# Patient Record
Sex: FEMALE | Race: AMERICAN INDIAN OR ALASKA NATIVE
[De-identification: names, ages, dates, MRNs, and addresses within clinical notes are randomized per-mention and may not be internally consistent; named-entity substitution may affect disease eponyms.]

---

## 2020-10-11 ENCOUNTER — HOSPITAL ENCOUNTER (EMERGENCY)
Dept: HOSPITAL 5 - ED | Age: 30
Discharge: HOME | End: 2020-10-11
Payer: MEDICAID

## 2020-10-11 VITALS — SYSTOLIC BLOOD PRESSURE: 177 MMHG | DIASTOLIC BLOOD PRESSURE: 96 MMHG

## 2020-10-11 DIAGNOSIS — F17.200: ICD-10-CM

## 2020-10-11 DIAGNOSIS — Z79.899: ICD-10-CM

## 2020-10-11 DIAGNOSIS — I10: ICD-10-CM

## 2020-10-11 DIAGNOSIS — Z98.51: ICD-10-CM

## 2020-10-11 DIAGNOSIS — F43.21: Primary | ICD-10-CM

## 2020-10-11 LAB
BACTERIA #/AREA URNS HPF: (no result) /HPF
BASOPHILS # (AUTO): 0.1 K/MM3 (ref 0–0.1)
BASOPHILS NFR BLD AUTO: 0.7 % (ref 0–1.8)
BILIRUB UR QL STRIP: (no result)
BLOOD UR QL VISUAL: (no result)
BUN SERPL-MCNC: 12 MG/DL (ref 7–17)
BUN/CREAT SERPL: 15 %
CALCIUM SERPL-MCNC: 9.3 MG/DL (ref 8.4–10.2)
EOSINOPHIL # BLD AUTO: 0 K/MM3 (ref 0–0.4)
EOSINOPHIL NFR BLD AUTO: 0.1 % (ref 0–4.3)
HCT VFR BLD CALC: 43.2 % (ref 30.3–42.9)
HEMOLYSIS INDEX: 23
HGB BLD-MCNC: 14.4 GM/DL (ref 10.1–14.3)
LYMPHOCYTES # BLD AUTO: 2.2 K/MM3 (ref 1.2–5.4)
LYMPHOCYTES NFR BLD AUTO: 13.8 % (ref 13.4–35)
MCHC RBC AUTO-ENTMCNC: 33 % (ref 30–34)
MCV RBC AUTO: 84 FL (ref 79–97)
MONOCYTES # (AUTO): 0.9 K/MM3 (ref 0–0.8)
MONOCYTES % (AUTO): 5.6 % (ref 0–7.3)
MUCOUS THREADS #/AREA URNS HPF: (no result) /HPF
PH UR STRIP: 6 [PH] (ref 5–7)
PLATELET # BLD: 282 K/MM3 (ref 140–440)
PROT UR STRIP-MCNC: (no result) MG/DL
RBC # BLD AUTO: 5.15 M/MM3 (ref 3.65–5.03)
RBC #/AREA URNS HPF: 5 /HPF (ref 0–6)
UROBILINOGEN UR-MCNC: < 2 MG/DL (ref ?–2)
WBC #/AREA URNS HPF: 7 /HPF (ref 0–6)

## 2020-10-11 PROCEDURE — 80320 DRUG SCREEN QUANTALCOHOLS: CPT

## 2020-10-11 PROCEDURE — G0480 DRUG TEST DEF 1-7 CLASSES: HCPCS

## 2020-10-11 PROCEDURE — 85025 COMPLETE CBC W/AUTO DIFF WBC: CPT

## 2020-10-11 PROCEDURE — 81001 URINALYSIS AUTO W/SCOPE: CPT

## 2020-10-11 PROCEDURE — 36415 COLL VENOUS BLD VENIPUNCTURE: CPT

## 2020-10-11 PROCEDURE — 80307 DRUG TEST PRSMV CHEM ANLYZR: CPT

## 2020-10-11 PROCEDURE — 81025 URINE PREGNANCY TEST: CPT

## 2020-10-11 PROCEDURE — 80048 BASIC METABOLIC PNL TOTAL CA: CPT

## 2020-10-11 NOTE — EMERGENCY DEPARTMENT REPORT
HPI





- General


Chief Complaint: Psych


Time Seen by Provider: 10/11/20 12:21





- HPI


HPI: 





Room 19








Patient is a 29-year-old female present with a chief complaint of sadness.  

Patient states she has felt intermittently sad for the past 8 years.  Patient 

states her grandmother  recently () and this brought on her current

sadness.  Patient denies suicidal or homicidal ideation.  Patient denies visual 

or auditory hallucinations.  Patient states she has not been sleeping since 

.  Patient states her mother made her come to the hospital for 

evaluation.  Patient states she was on blood pressure medication but stopped 

taking it secondary to side effects 2 years ago





ED Past Medical Hx





- Past Medical History


Previous Medical History?: Yes


Hx Hypertension: Yes





- Surgical History


Past Surgical History?: Yes


Additional Surgical History: tubal ligation





- Family History


Family history: no significant





- Social History


Smoking Status: Current Every Day Smoker (1 pack/day)


Substance Use Type: None (Denies illicit drug use), Alcohol (Occasional)





- Medications


Home Medications: 


                                Home Medications











 Medication  Instructions  Recorded  Confirmed  Last Taken  Type


 


Cyclobenzaprine [Flexeril] 10 mg PO TID PRN #15 tablet 16  Unknown Rx


 


traMADoL [Ultram] 50 mg PO Q6HR PRN #20 tablet 16  Unknown Rx


 


amLODIPine 5 mg PO DAILY #90 tab 10/11/20  Unknown Rx














ED Review of Systems


ROS: 


Stated complaint: MENTAL BREAKDOWN


Other details as noted in HPI





Constitutional: no symptoms reported


Respiratory: no symptoms reported


Endocrine: no symptoms reported


Psychiatric: denies: auditory hallucinations, visual hallucinations, homicidal 

thoughts, suicidal thoughts





Physical Exam





- Physical Exam


Vital Signs: 


                                   Vital Signs











  10/11/20





  12:19


 


Temperature 99.2 F


 


Pulse Rate 107 H


 


Respiratory 18





Rate 


 


Blood Pressure 232/113


 


O2 Sat by Pulse 100





Oximetry 











Physical Exam: 





GENERAL: The patient is well-developed well-nourished female sitting on 

stretcher appearing tearful. []


HEENT: Normocephalic.  Atraumatic.  Extraocular motions are intact.  Patient has

 moist mucous membranes.


NECK: Supple.  Trachea midline


CHEST/LUNGS: Clear to auscultation.  There is no respiratory distress noted.


HEART/CARDIOVASCULAR: Regular.  There is no tachycardia.  There is no gallop rub

 or murmur.


ABDOMEN: Abdomen is soft, nontender.  Patient has normal bowel sounds.  There is

 no abdominal distention.


SKIN: There is no rash.  There is no edema.  There is no diaphoresis.


NEURO: The patient is awake, alert, and oriented.  The patient is cooperative.  

The patient has no focal neurologic deficits.  The patient has normal speech


MUSCULOSKELETAL:  There is no evidence of acute injury.





ED Course


                                   Vital Signs











  10/11/20





  12:19


 


Temperature 99.2 F


 


Pulse Rate 107 H


 


Respiratory 18





Rate 


 


Blood Pressure 232/113


 


O2 Sat by Pulse 100





Oximetry 














ED Medical Decision Making





- Lab Data


Result diagrams: 


                                 10/11/20 12:38





                                 10/11/20 12:38





                                Laboratory Tests











  10/11/20 10/11/20 10/11/20





  12:38 12:38 12:38


 


WBC  16.0 H  


 


RBC  5.15 H  


 


Hgb  14.4 H  


 


Hct  43.2 H  


 


MCV  84  


 


MCH  28  


 


MCHC  33  


 


RDW  14.5  


 


Plt Count  282  


 


Lymph % (Auto)  13.8  


 


Mono % (Auto)  5.6  


 


Eos % (Auto)  0.1  


 


Baso % (Auto)  0.7  


 


Lymph # (Auto)  2.2  


 


Mono # (Auto)  0.9 H  


 


Eos # (Auto)  0.0  


 


Baso # (Auto)  0.1  


 


Seg Neutrophils %  79.8 H  


 


Seg Neutrophils #  12.8 H  


 


Sodium   137 


 


Potassium   3.8 


 


Chloride   100.7 


 


Carbon Dioxide   26 


 


Anion Gap   14 


 


BUN   12 


 


Creatinine   0.8 


 


Estimated GFR   > 60 


 


BUN/Creatinine Ratio   15 


 


Glucose   100 


 


Calcium   9.3 


 


Urine Color   


 


Urine Turbidity   


 


Urine pH   


 


Ur Specific Gravity   


 


Urine Protein   


 


Urine Glucose (UA)   


 


Urine Ketones   


 


Urine Blood   


 


Urine Nitrite   


 


Urine Bilirubin   


 


Urine Urobilinogen   


 


Ur Leukocyte Esterase   


 


Urine WBC (Auto)   


 


Urine RBC (Auto)   


 


U Epithel Cells (Auto)   


 


Urine Bacteria (Auto)   


 


Urine Mucus   


 


Urine HCG, Qual   


 


Salicylates    < 0.3 L


 


Urine Opiates Screen   


 


Urine Methadone Screen   


 


Acetaminophen   


 


Ur Barbiturates Screen   


 


Ur Phencyclidine Scrn   


 


Ur Amphetamines Screen   


 


U Benzodiazepines Scrn   


 


Urine Cocaine Screen   


 


U Marijuana (THC) Screen   


 


Drugs of Abuse Note   


 


Plasma/Serum Alcohol   














  10/11/20 10/11/20 10/11/20





  12:38 12:38 Unknown


 


WBC   


 


RBC   


 


Hgb   


 


Hct   


 


MCV   


 


MCH   


 


MCHC   


 


RDW   


 


Plt Count   


 


Lymph % (Auto)   


 


Mono % (Auto)   


 


Eos % (Auto)   


 


Baso % (Auto)   


 


Lymph # (Auto)   


 


Mono # (Auto)   


 


Eos # (Auto)   


 


Baso # (Auto)   


 


Seg Neutrophils %   


 


Seg Neutrophils #   


 


Sodium   


 


Potassium   


 


Chloride   


 


Carbon Dioxide   


 


Anion Gap   


 


BUN   


 


Creatinine   


 


Estimated GFR   


 


BUN/Creatinine Ratio   


 


Glucose   


 


Calcium   


 


Urine Color    Yellow


 


Urine Turbidity    Cloudy


 


Urine pH    6.0


 


Ur Specific Gravity    1.021


 


Urine Protein    <15 mg/dl


 


Urine Glucose (UA)    Neg


 


Urine Ketones    Tr


 


Urine Blood    Neg


 


Urine Nitrite    Neg


 


Urine Bilirubin    Neg


 


Urine Urobilinogen    < 2.0


 


Ur Leukocyte Esterase    Lg


 


Urine WBC (Auto)    7.0 H


 


Urine RBC (Auto)    5.0


 


U Epithel Cells (Auto)    28.0 H


 


Urine Bacteria (Auto)    1+


 


Urine Mucus    1+


 


Urine HCG, Qual    Negative


 


Salicylates   


 


Urine Opiates Screen   


 


Urine Methadone Screen   


 


Acetaminophen  5.0 L  


 


Ur Barbiturates Screen   


 


Ur Phencyclidine Scrn   


 


Ur Amphetamines Screen   


 


U Benzodiazepines Scrn   


 


Urine Cocaine Screen   


 


U Marijuana (THC) Screen   


 


Drugs of Abuse Note   


 


Plasma/Serum Alcohol   < 0.01 














  10/11/20





  Unknown


 


WBC 


 


RBC 


 


Hgb 


 


Hct 


 


MCV 


 


MCH 


 


MCHC 


 


RDW 


 


Plt Count 


 


Lymph % (Auto) 


 


Mono % (Auto) 


 


Eos % (Auto) 


 


Baso % (Auto) 


 


Lymph # (Auto) 


 


Mono # (Auto) 


 


Eos # (Auto) 


 


Baso # (Auto) 


 


Seg Neutrophils % 


 


Seg Neutrophils # 


 


Sodium 


 


Potassium 


 


Chloride 


 


Carbon Dioxide 


 


Anion Gap 


 


BUN 


 


Creatinine 


 


Estimated GFR 


 


BUN/Creatinine Ratio 


 


Glucose 


 


Calcium 


 


Urine Color 


 


Urine Turbidity 


 


Urine pH 


 


Ur Specific Gravity 


 


Urine Protein 


 


Urine Glucose (UA) 


 


Urine Ketones 


 


Urine Blood 


 


Urine Nitrite 


 


Urine Bilirubin 


 


Urine Urobilinogen 


 


Ur Leukocyte Esterase 


 


Urine WBC (Auto) 


 


Urine RBC (Auto) 


 


U Epithel Cells (Auto) 


 


Urine Bacteria (Auto) 


 


Urine Mucus 


 


Urine HCG, Qual 


 


Salicylates 


 


Urine Opiates Screen  Negative


 


Urine Methadone Screen  Negative


 


Acetaminophen 


 


Ur Barbiturates Screen  Negative


 


Ur Phencyclidine Scrn  Negative


 


Ur Amphetamines Screen  Negative


 


U Benzodiazepines Scrn  Negative


 


Urine Cocaine Screen  Negative


 


U Marijuana (THC) Screen  Positive


 


Drugs of Abuse Note  Disclamer


 


Plasma/Serum Alcohol 














- Differential Diagnosis


Depression, grieving, adjustment disorder, hypertension


Critical care attestation.: 


If time is entered above; I have spent that time in minutes in the direct care 

of this critically ill patient, excluding procedure time.








ED Disposition


Clinical Impression: 


 Hypertension, Grieving





Disposition: DC-01 TO HOME OR SELFCARE


Is pt being admited?: No


Does the pt Need Aspirin: No


Condition: Stable


Instructions:  Hypertension (ED)


Additional Instructions: 


Return to the emergency department should you develop worsening symptoms, 

inability to tolerate food or liquids, high fever or any other concerns


Prescriptions: 


amLODIPine 5 mg PO DAILY #90 tab


Referrals: 


HOWARD MARIE MD [Staff Physician] - 3-5 Days (Dr. Marie is a primary 

physician.  Please follow-up with him to be established as a patient)


Time of Disposition: 15:57

## 2020-10-11 NOTE — EMERGENCY DEPARTMENT REPORT
Blank Doc





- Documentation


Documentation: 





29-year-old female that presents with depression.  Denies any SI/HI.





This initial assessment/diagnostic orders/clinical plan/treatment(s) is/are 

subject to change based on patient's health status, clinical progression and re-

assessment by fellow clinical providers in the ED.  Further treatment and workup

at subsequent clinical providers discretion.  Patient/guardians urged not to 

elope from the ED as their condition may be serious if not clinically assessed 

and managed.  Initial orders include:


1- Patient sent to MAIN ED for further evaluation and treatment


2- Charge RN was notified to have patient be brought back ASAP.


3- RN was notified to keep patient as close range and observation until room 

available

## 2022-08-26 ENCOUNTER — HOSPITAL ENCOUNTER (EMERGENCY)
Dept: HOSPITAL 5 - ED | Age: 32
LOS: 3 days | Discharge: HOME | End: 2022-08-29
Payer: SELF-PAY

## 2022-08-26 DIAGNOSIS — I10: ICD-10-CM

## 2022-08-26 DIAGNOSIS — F60.0: Primary | ICD-10-CM

## 2022-08-26 DIAGNOSIS — F17.210: ICD-10-CM

## 2022-08-26 DIAGNOSIS — R45.851: ICD-10-CM

## 2022-08-26 DIAGNOSIS — Z72.89: ICD-10-CM

## 2022-08-26 DIAGNOSIS — Z98.51: ICD-10-CM

## 2022-08-26 DIAGNOSIS — Z79.899: ICD-10-CM

## 2022-08-26 DIAGNOSIS — Z20.822: ICD-10-CM

## 2022-08-26 LAB
ALBUMIN SERPL-MCNC: 5.6 G/DL (ref 3.9–5)
ALT SERPL-CCNC: 14 UNITS/L (ref 7–56)
BACTERIA #/AREA URNS HPF: (no result) /HPF
BASOPHILS # (AUTO): 0.1 K/MM3 (ref 0–0.1)
BASOPHILS NFR BLD AUTO: 0.5 % (ref 0–1.8)
BENZODIAZEPINES SCREEN,URINE: (no result)
BUN SERPL-MCNC: 11 MG/DL (ref 7–17)
BUN/CREAT SERPL: 11 %
CALCIUM SERPL-MCNC: 10.3 MG/DL (ref 8.4–10.2)
EOSINOPHIL # BLD AUTO: 0 K/MM3 (ref 0–0.4)
EOSINOPHIL NFR BLD AUTO: 0.1 % (ref 0–4.3)
HCT VFR BLD CALC: 52 % (ref 30.3–42.9)
HEMOLYSIS INDEX: 27
HGB BLD-MCNC: 17 GM/DL (ref 10.1–14.3)
LYMPHOCYTES # BLD AUTO: 2.7 K/MM3 (ref 1.2–5.4)
LYMPHOCYTES NFR BLD AUTO: 15.4 % (ref 13.4–35)
MCHC RBC AUTO-ENTMCNC: 33 % (ref 30–34)
MCV RBC AUTO: 84 FL (ref 79–97)
METHADONE SCREEN,URINE: (no result)
MONOCYTES # (AUTO): 1.3 K/MM3 (ref 0–0.8)
MONOCYTES % (AUTO): 7.4 % (ref 0–7.3)
MUCOUS THREADS #/AREA URNS HPF: (no result) /HPF
OPIATE SCREEN,URINE: (no result)
PLATELET # BLD: 329 K/MM3 (ref 140–440)
RBC # BLD AUTO: 6.19 M/MM3 (ref 3.65–5.03)
RBC #/AREA URNS HPF: 54 /HPF (ref 0–6)
WBC #/AREA URNS HPF: 46 /HPF (ref 0–6)

## 2022-08-26 PROCEDURE — G0480 DRUG TEST DEF 1-7 CLASSES: HCPCS

## 2022-08-26 PROCEDURE — 81001 URINALYSIS AUTO W/SCOPE: CPT

## 2022-08-26 PROCEDURE — 36415 COLL VENOUS BLD VENIPUNCTURE: CPT

## 2022-08-26 PROCEDURE — 80053 COMPREHEN METABOLIC PANEL: CPT

## 2022-08-26 PROCEDURE — U0003 INFECTIOUS AGENT DETECTION BY NUCLEIC ACID (DNA OR RNA); SEVERE ACUTE RESPIRATORY SYNDROME CORONAVIRUS 2 (SARS-COV-2) (CORONAVIRUS DISEASE [COVID-19]), AMPLIFIED PROBE TECHNIQUE, MAKING USE OF HIGH THROUGHPUT TECHNOLOGIES AS DESCRIBED BY CMS-2020-01-R: HCPCS

## 2022-08-26 PROCEDURE — 84702 CHORIONIC GONADOTROPIN TEST: CPT

## 2022-08-26 PROCEDURE — 80320 DRUG SCREEN QUANTALCOHOLS: CPT

## 2022-08-26 PROCEDURE — 85025 COMPLETE CBC W/AUTO DIFF WBC: CPT

## 2022-08-26 PROCEDURE — 80307 DRUG TEST PRSMV CHEM ANLYZR: CPT

## 2022-08-26 PROCEDURE — 87086 URINE CULTURE/COLONY COUNT: CPT

## 2022-08-26 PROCEDURE — 99284 EMERGENCY DEPT VISIT MOD MDM: CPT

## 2022-08-26 NOTE — EMERGENCY DEPARTMENT REPORT
ED General Adult HPI





- General


Chief complaint: Psych


Stated complaint: MENTAL BREAKDOWN


PUI?: No


Time Seen by Provider: 08/26/22 09:38


Source: patient


Mode of arrival: Ambulatory


Limitations: No Limitations





- History of Present Illness


Initial comments: 


This is a 36-year-old female with a past medical history of paranoia, depression

who presents with her mother today complaining of having a mental breakdown.  

Patient's mother states she has been very paranoid stating that the police is 

out to get her and someone is out to get her.  Mom states she is a scattered 

thought and she is speaking in a scattered manner.  Patient denies any suicidal 

ideation today but states she had thoughts of hurting herself in the past with 

no plan of action.  Patient states that she just needs a medication but cannot 

recall what medication she is taking.  Patient states that she was taking 

metronidazole for depression.  It is unclear if patient knows of medication she 

is taking.  Per medical history last time patient was here was about 2 years ago

with the same complaints of having a mental breakdown.





Severity scale (0 -10): 0





- Related Data


                                  Previous Rx's











 Medication  Instructions  Recorded  Last Taken  Type


 


Cyclobenzaprine [Flexeril] 10 mg PO TID PRN #15 tablet 04/04/16 Unknown Rx


 


traMADoL [Ultram] 50 mg PO Q6HR PRN #20 tablet 04/04/16 Unknown Rx


 


amLODIPine 5 mg PO DAILY #90 tab 10/11/20 Unknown Rx











                                    Allergies











Allergy/AdvReac Type Severity Reaction Status Date / Time


 


No Known Allergies Allergy   Verified 08/26/22 08:31














ED Review of Systems


ROS: 


Stated complaint: MENTAL BREAKDOWN


Other details as noted in HPI





Comment: All other systems reviewed and negative





ED Past Medical Hx





- Past Medical History


Hx Hypertension: Yes





- Surgical History


Additional Surgical History: tubal ligation





- Social History


Smoking Status: Current Every Day Smoker (1 pack/day)


Substance Use Type: None (Denies illicit drug use), Alcohol (Occasional)





- Medications


Home Medications: 


                                Home Medications











 Medication  Instructions  Recorded  Confirmed  Last Taken  Type


 


Cyclobenzaprine [Flexeril] 10 mg PO TID PRN #15 tablet 04/04/16  Unknown Rx


 


traMADoL [Ultram] 50 mg PO Q6HR PRN #20 tablet 04/04/16  Unknown Rx


 


amLODIPine 5 mg PO DAILY #90 tab 10/11/20  Unknown Rx














ED Physical Exam





- General


Limitations: No Limitations


General appearance: alert, in no apparent distress





- Head


Head exam: Present: atraumatic, normocephalic





- Eye


Eye exam: Present: normal appearance, PERRL





- ENT


ENT exam: Present: mucous membranes moist





- Neck


Neck exam: Present: normal inspection





- Respiratory


Respiratory exam: Present: normal lung sounds bilaterally.  Absent: respiratory 

distress





- Cardiovascular


Cardiovascular Exam: Present: regular rate, normal rhythm.  Absent: systolic 

murmur, diastolic murmur, rubs, gallop





- GI/Abdominal


GI/Abdominal exam: Present: soft, normal bowel sounds





- Extremities Exam


Extremities exam: Present: normal inspection





- Back Exam


Back exam: Present: normal inspection





- Neurological Exam


Neurological exam: Present: alert, oriented X3





- Psychiatric


Psychiatric exam: Present: normal affect, normal mood





- Skin


Skin exam: Present: warm, dry, intact, normal color.  Absent: rash





ED Course


                                   Vital Signs











  08/26/22





  08:24


 


Temperature 97.3 F L


 


Pulse Rate 120 H


 


Blood Pressure 153/90





[Right] 


 


O2 Sat by Pulse 97





Oximetry 














ED Medical Decision Making





- Lab Data


Result diagrams: 


                                 08/26/22 11:16





                                 08/26/22 11:16





- Medical Decision Making





31-year-old female presents with paranoia and depression.


All labs collected.  Patient has leukocytosis.


Vital signs are normal.  Urinalysis pending.


Mental health evaluation/assessment completed and recommending to continue 

inpatient care.


Charge nurse notified.


1013 order set activated.  Patient was transferred to the main ED psych .  At 

this time patient is seen and evaluated.  Vital signs are normal.


Critical care attestation.: 


If time is entered above; I have spent that time in minutes in the direct care 

of this critically ill patient, excluding procedure time.








ED Disposition


Clinical Impression: 


 Paranoid disorder, Suicidal behavior





Disposition: 02 SHORT TERM HOSPITAL


Is pt being admited?: No


Does the pt Need Aspirin: No


Referrals: 


PRIMARY CARE,MD [Primary Care Provider] - 3-5 Days

## 2022-08-27 RX ADMIN — SULFAMETHOXAZOLE AND TRIMETHOPRIM SCH EACH: 800; 160 TABLET ORAL at 13:26

## 2022-08-27 RX ADMIN — SULFAMETHOXAZOLE AND TRIMETHOPRIM SCH EACH: 800; 160 TABLET ORAL at 22:17
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
## 2022-08-27 NOTE — EVENT NOTE
Date: 08/27/22





S: No events reported overnight


O:


                               Vital Signs - 8 hr











  08/27/22





  03:49


 


Temperature 98.8 F


 


Pulse Rate 94 H


 


Respiratory 18





Rate 


 


Blood Pressure 157/83





[Left] 


 


O2 Sat by Pulse 100





Oximetry 








E: Paranoid disorder, suicidal behavior


P: 213/awaiting psych eval

## 2022-08-28 RX ADMIN — SULFAMETHOXAZOLE AND TRIMETHOPRIM SCH EACH: 800; 160 TABLET ORAL at 21:43

## 2022-08-28 RX ADMIN — SULFAMETHOXAZOLE AND TRIMETHOPRIM SCH EACH: 800; 160 TABLET ORAL at 10:41

## 2022-08-28 NOTE — EVENT NOTE
Date: 08/28/22





S: No events reported overnight


O:


                               Vital Signs - 8 hr











  08/28/22 08/28/22





  11:04 11:05


 


Temperature 98.6 F 


 


Pulse Rate 89 


 


Respiratory 18 





Rate  


 


Blood Pressure 156/83 





[Left]  


 


O2 Sat by Pulse 99 99





Oximetry  








A: Major depressive disorder


P: 1013/awaiting inpatient psych

## 2022-08-28 NOTE — PROGRESS NOTE
Subjective





- Reason for Consult


Consult date: 08/28/22


Reason for consult: major depressive disorder





- Chief Complaint


Chief complaint: 


The patient was seen today. She is irritable and requesting to be in another 

room away from people. She is guarded. She seems quite delusional. She takes a 

few minutes before she actually starts speaking. The patient is telling me she 

doesn't remember talking to me and no one is giving her any meds. She denies 

speaking to me or telling me anything yesterday. The nurse has ensured me the 

patient is getting her meds. She denies SI/HI, although her mother states that 

the patient has made several statements about ending her life. The patient says 

"If I get my meds, I'm able to sustain." She then says "where am I and why am I 

back here with all these old people. When did I get back here. I want out of 

here." Security then placed the patient in seclusion. 





REVIEW OF SYSTEMS


Unable to assess





MENTAL STATUS EXAMINATION


General Appearance and Behavior: Age appropriate, evasive, not forthcoming





Diagnoses: 


Major Depressive Disorder





Treatment Plan


1013


Increase Prozac 20mg po daily


Increase Abilify 10mg po daily


Trazodone 50mg po qhs


Medical: per primary


Disposition: Recommend acute psychiatric inpatient treatment


Will follow. Thanks


Case staffed with Dr. Mendoza








Mental Status Exam





- Vital signs


                                Last Vital Signs











Temp  99.0 F   08/27/22 20:53


 


Pulse  100 H  08/27/22 20:53


 


Resp  18   08/27/22 20:53


 


BP  169/102   08/27/22 20:53


 


Pulse Ox  100   08/27/22 20:53

## 2022-08-29 VITALS — DIASTOLIC BLOOD PRESSURE: 95 MMHG | SYSTOLIC BLOOD PRESSURE: 176 MMHG

## 2022-08-29 RX ADMIN — SULFAMETHOXAZOLE AND TRIMETHOPRIM SCH EACH: 800; 160 TABLET ORAL at 10:00

## 2022-08-29 NOTE — EVENT NOTE
Date: 08/29/22





Psychiatric team have recommended discontinuation of 1013.  Nursing team reports

no acute issues.  Urinalysis contaminated.  Urine culture negative.  

Leukocytosis likely a stress reaction.  Elevated blood pressure chronic.  Has 

been prescribed Norvasc in the past.  Patient medically cleared on her initial 

ER evaluation.  Laboratory studies vital signs, nursing and psychiatric 

documentation as well as ER documentation reviewed and appreciated.  To be 

discharged with outpatient follow-up


                                   Vital Signs











  08/26/22 08/26/22 08/26/22





  08:24 20:37 22:03


 


Temperature 97.3 F L  98.5 F


 


Pulse Rate 120 H  109 H


 


Respiratory   16





Rate   


 


Blood Pressure   


 


Blood Pressure   





[Left]   


 


Blood Pressure 153/90  163/115





[Right]   


 


O2 Sat by Pulse 97 96 98





Oximetry   














  08/26/22 08/27/22 08/27/22





  22:40 03:49 10:01


 


Temperature  98.8 F 97.8 F


 


Pulse Rate 109 H 94 H 106 H


 


Respiratory  18 18





Rate   


 


Blood Pressure 163/115  


 


Blood Pressure  157/83 161/97





[Left]   


 


Blood Pressure   





[Right]   


 


O2 Sat by Pulse  100 98





Oximetry   














  08/27/22 08/27/22 08/28/22





  10:02 20:53 11:04


 


Temperature  99.0 F 98.6 F


 


Pulse Rate  100 H 89


 


Respiratory  18 18





Rate   


 


Blood Pressure   


 


Blood Pressure  169/102 156/83





[Left]   


 


Blood Pressure   





[Right]   


 


O2 Sat by Pulse 98 100 99





Oximetry   














  08/28/22 08/28/22 08/28/22





  11:05 22:02 22:44


 


Temperature  100 F H 


 


Pulse Rate  104 H 104 H


 


Respiratory  16 





Rate   


 


Blood Pressure   153/103


 


Blood Pressure  153/103 





[Left]   


 


Blood Pressure   





[Right]   


 


O2 Sat by Pulse 99 98 





Oximetry   














  08/29/22 08/29/22





  08:41 08:42


 


Temperature 98.6 F 


 


Pulse Rate 98 H 


 


Respiratory 18 





Rate  


 


Blood Pressure  


 


Blood Pressure 176/95 





[Left]  


 


Blood Pressure  





[Right]  


 


O2 Sat by Pulse 100 100





Oximetry  








                                   Lab Results











  08/26/22 08/26/22 08/26/22 Range/Units





  11:16 11:16 11:16 


 


WBC  17.6 H    (4.5-11.0)  K/mm3


 


RBC  6.19 H    (3.65-5.03)  M/mm3


 


Hgb  17.0 H    (10.1-14.3)  gm/dl


 


Hct  52.0 H    (30.3-42.9)  %


 


MCV  84    (79-97)  fl


 


MCH  28    (28-32)  pg


 


MCHC  33    (30-34)  %


 


RDW  14.7    (13.2-15.2)  %


 


Plt Count  329    (140-440)  K/mm3


 


Lymph % (Auto)  15.4    (13.4-35.0)  %


 


Mono % (Auto)  7.4 H    (0.0-7.3)  %


 


Eos % (Auto)  0.1    (0.0-4.3)  %


 


Baso % (Auto)  0.5    (0.0-1.8)  %


 


Lymph # (Auto)  2.7    (1.2-5.4)  K/mm3


 


Mono # (Auto)  1.3 H    (0.0-0.8)  K/mm3


 


Eos # (Auto)  0.0    (0.0-0.4)  K/mm3


 


Baso # (Auto)  0.1    (0.0-0.1)  K/mm3


 


Seg Neutrophils %  76.6 H    (40.0-70.0)  %


 


Seg Neutrophils #  13.5 H    (1.8-7.7)  K/mm3


 


Sodium   139   (137-145)  mmol/L


 


Potassium   4.2   (3.6-5.0)  mmol/L


 


Chloride   98.6   ()  mmol/L


 


Carbon Dioxide   25   (22-30)  mmol/L


 


Anion Gap   20   mmol/L


 


BUN   11   (7-17)  mg/dL


 


Creatinine   1.0   (0.6-1.2)  mg/dL


 


Estimated GFR   > 60   ml/min


 


BUN/Creatinine Ratio   11   %


 


Glucose   104 H   ()  mg/dL


 


Calcium   10.3 H   (8.4-10.2)  mg/dL


 


Total Bilirubin   0.30   (0.1-1.2)  mg/dL


 


AST   17   (5-40)  units/L


 


ALT   14   (7-56)  units/L


 


Alkaline Phosphatase   71   ()  units/L


 


Total Protein   8.4 H   (6.3-8.2)  g/dL


 


Albumin   5.6 H   (3.9-5)  g/dL


 


Albumin/Globulin Ratio   2.0   %


 


HCG, Quant     (0-4)  mIU/mL


 


Urine Color     (Yellow)  


 


Urine Turbidity     (Clear)  


 


Specific Gravity (Man)     (1.003-1.030)  


 


Ur Protein (Man)     (Negative)  mg/dL


 


Ur Ketones (Man)     (Negative)  


 


Ur Nitrite (Man)     (Negative)  


 


Urine Bilirubin (Man)     (Negative)  


 


Leukocyte Esterase (Man)     (Negative)  


 


Urine WBC (Auto)     (0.0-6.0)  /HPF


 


Urine RBC (Auto)     (0.0-6.0)  /HPF


 


U Epithel Cells (Auto)     (0-13.0)  /HPF


 


Urine Bacteria (Auto)     (Negative)  /HPF


 


Urine RBC (Manual)     (Negative)  


 


Urine Mucus     /HPF


 


Urine Yeast (Budding)     /HPF


 


Salicylates    < 0.3 L  (2.8-20.0)  mg/dL


 


Urine Opiates Screen     


 


Urine Methadone Screen     


 


Acetaminophen     (10.0-30.0)  ug/mL


 


Ur Barbiturates Screen     


 


Ur Phencyclidine Scrn     


 


Ur Amphetamines Screen     


 


U Benzodiazepines Scrn     


 


Urine Cocaine Screen     


 


U Marijuana (THC) Screen     


 


Drugs of Abuse Note     


 


SARS-CoV-2 (PCR)     (Negative)  














  08/26/22 08/26/22 08/26/22 Range/Units





  11:16 11:16 22:54 


 


WBC     (4.5-11.0)  K/mm3


 


RBC     (3.65-5.03)  M/mm3


 


Hgb     (10.1-14.3)  gm/dl


 


Hct     (30.3-42.9)  %


 


MCV     (79-97)  fl


 


MCH     (28-32)  pg


 


MCHC     (30-34)  %


 


RDW     (13.2-15.2)  %


 


Plt Count     (140-440)  K/mm3


 


Lymph % (Auto)     (13.4-35.0)  %


 


Mono % (Auto)     (0.0-7.3)  %


 


Eos % (Auto)     (0.0-4.3)  %


 


Baso % (Auto)     (0.0-1.8)  %


 


Lymph # (Auto)     (1.2-5.4)  K/mm3


 


Mono # (Auto)     (0.0-0.8)  K/mm3


 


Eos # (Auto)     (0.0-0.4)  K/mm3


 


Baso # (Auto)     (0.0-0.1)  K/mm3


 


Seg Neutrophils %     (40.0-70.0)  %


 


Seg Neutrophils #     (1.8-7.7)  K/mm3


 


Sodium     (137-145)  mmol/L


 


Potassium     (3.6-5.0)  mmol/L


 


Chloride     ()  mmol/L


 


Carbon Dioxide     (22-30)  mmol/L


 


Anion Gap     mmol/L


 


BUN     (7-17)  mg/dL


 


Creatinine     (0.6-1.2)  mg/dL


 


Estimated GFR     ml/min


 


BUN/Creatinine Ratio     %


 


Glucose     ()  mg/dL


 


Calcium     (8.4-10.2)  mg/dL


 


Total Bilirubin     (0.1-1.2)  mg/dL


 


AST     (5-40)  units/L


 


ALT     (7-56)  units/L


 


Alkaline Phosphatase     ()  units/L


 


Total Protein     (6.3-8.2)  g/dL


 


Albumin     (3.9-5)  g/dL


 


Albumin/Globulin Ratio     %


 


HCG, Quant   < 2   (0-4)  mIU/mL


 


Urine Color    Yellow  (Yellow)  


 


Urine Turbidity    Hazy  (Clear)  


 


Specific Gravity (Man)    1.015  (1.003-1.030)  


 


Ur Protein (Man)    2+  (Negative)  mg/dL


 


Ur Ketones (Man)    Negative  (Negative)  


 


Ur Nitrite (Man)    Negative  (Negative)  


 


Urine Bilirubin (Man)    Negative  (Negative)  


 


Leukocyte Esterase (Man)    Negative  (Negative)  


 


Urine WBC (Auto)    46.0 H  (0.0-6.0)  /HPF


 


Urine RBC (Auto)    54.0  (0.0-6.0)  /HPF


 


U Epithel Cells (Auto)    14.0 H  (0-13.0)  /HPF


 


Urine Bacteria (Auto)    1+  (Negative)  /HPF


 


Urine RBC (Manual)    1+  (Negative)  


 


Urine Mucus    Few  /HPF


 


Urine Yeast (Budding)    1+  /HPF


 


Salicylates     (2.8-20.0)  mg/dL


 


Urine Opiates Screen     


 


Urine Methadone Screen     


 


Acetaminophen  5.0 L    (10.0-30.0)  ug/mL


 


Ur Barbiturates Screen     


 


Ur Phencyclidine Scrn     


 


Ur Amphetamines Screen     


 


U Benzodiazepines Scrn     


 


Urine Cocaine Screen     


 


U Marijuana (THC) Screen     


 


Drugs of Abuse Note     


 


SARS-CoV-2 (PCR)     (Negative)  














  08/26/22 08/27/22 Range/Units





  22:54 08:48 


 


WBC    (4.5-11.0)  K/mm3


 


RBC    (3.65-5.03)  M/mm3


 


Hgb    (10.1-14.3)  gm/dl


 


Hct    (30.3-42.9)  %


 


MCV    (79-97)  fl


 


MCH    (28-32)  pg


 


MCHC    (30-34)  %


 


RDW    (13.2-15.2)  %


 


Plt Count    (140-440)  K/mm3


 


Lymph % (Auto)    (13.4-35.0)  %


 


Mono % (Auto)    (0.0-7.3)  %


 


Eos % (Auto)    (0.0-4.3)  %


 


Baso % (Auto)    (0.0-1.8)  %


 


Lymph # (Auto)    (1.2-5.4)  K/mm3


 


Mono # (Auto)    (0.0-0.8)  K/mm3


 


Eos # (Auto)    (0.0-0.4)  K/mm3


 


Baso # (Auto)    (0.0-0.1)  K/mm3


 


Seg Neutrophils %    (40.0-70.0)  %


 


Seg Neutrophils #    (1.8-7.7)  K/mm3


 


Sodium    (137-145)  mmol/L


 


Potassium    (3.6-5.0)  mmol/L


 


Chloride    ()  mmol/L


 


Carbon Dioxide    (22-30)  mmol/L


 


Anion Gap    mmol/L


 


BUN    (7-17)  mg/dL


 


Creatinine    (0.6-1.2)  mg/dL


 


Estimated GFR    ml/min


 


BUN/Creatinine Ratio    %


 


Glucose    ()  mg/dL


 


Calcium    (8.4-10.2)  mg/dL


 


Total Bilirubin    (0.1-1.2)  mg/dL


 


AST    (5-40)  units/L


 


ALT    (7-56)  units/L


 


Alkaline Phosphatase    ()  units/L


 


Total Protein    (6.3-8.2)  g/dL


 


Albumin    (3.9-5)  g/dL


 


Albumin/Globulin Ratio    %


 


HCG, Quant    (0-4)  mIU/mL


 


Urine Color    (Yellow)  


 


Urine Turbidity    (Clear)  


 


Specific Gravity (Man)    (1.003-1.030)  


 


Ur Protein (Man)    (Negative)  mg/dL


 


Ur Ketones (Man)    (Negative)  


 


Ur Nitrite (Man)    (Negative)  


 


Urine Bilirubin (Man)    (Negative)  


 


Leukocyte Esterase (Man)    (Negative)  


 


Urine WBC (Auto)    (0.0-6.0)  /HPF


 


Urine RBC (Auto)    (0.0-6.0)  /HPF


 


U Epithel Cells (Auto)    (0-13.0)  /HPF


 


Urine Bacteria (Auto)    (Negative)  /HPF


 


Urine RBC (Manual)    (Negative)  


 


Urine Mucus    /HPF


 


Urine Yeast (Budding)    /HPF


 


Salicylates    (2.8-20.0)  mg/dL


 


Urine Opiates Screen  Presumptive negative   


 


Urine Methadone Screen  Presumptive negative   


 


Acetaminophen    (10.0-30.0)  ug/mL


 


Ur Barbiturates Screen  Presumptive negative   


 


Ur Phencyclidine Scrn  Presumptive negative   


 


Ur Amphetamines Screen  Presumptive negative   


 


U Benzodiazepines Scrn  Presumptive negative   


 


Urine Cocaine Screen  Presumptive negative   


 


U Marijuana (THC) Screen  Presumptive positive   


 


Drugs of Abuse Note  Disclamer   


 


SARS-CoV-2 (PCR)   Negative  (Negative)

## 2022-08-29 NOTE — PROGRESS NOTE
Subjective





- Reason for Consult


Consult date: 08/29/22


Reason for consult: depression





- Chief Complaint


Chief complaint: 


The patient was seen today. She is much better today. She says "actually I feel 

pretty good." She is calm and cooperative. She says she feels "more stable and 

in a better mood." She denies SI/HI or hallucinations of any kind.





REVIEW OF SYSTEMS


Constitutional: Negative for weight loss


ENT: Negative for stridor


Respiratory: Negative for cough or hemoptysis


All other systems reviewed and are negative


 


MENTAL STATUS EXAMINATION


General Appearance and Behavior: Age appropriate, wearing appropriate clothes, 

cooperative, polite with questioning, good eye contact


Cooperation: cooperative


Psychomotor Behavior: Psychomotor normal


Mood: pretty good


Affect and affective range: congruent with stated affect


Thought Process: Goal directed


Thought Content: Reality oriented


Speech: Normal volume, Regular rate and rhythm 


Suicidal Ideation: Denies


Homicidal Ideation: Denies


Hallucination: Denies


Delusions: Denies


Impulse Control: Limited


Insight and Judgment: Limited


Memory: Intact


Attention:attentive


Orientation: Alert and oriented





Diagnoses: 


Major Depressive Disorder





Treatment Plan


d/c 1013


Prozac 20mg po daily


Abilify 10mg po daily


50mg po qhs


Medical: per primary


Disposition: Do not Recommend acute psychiatric inpatient treatment. The patient

understands that if SI/HI or any fear of endangerment arise she is to seek 

immediate assistance. 


Will sign off. Thanks


Case staffed with Dr. Mendoza








Mental Status Exam





- Vital signs


                                Last Vital Signs











Temp  98.6 F   08/29/22 08:41


 


Pulse  98 H  08/29/22 08:41


 


Resp  18   08/29/22 08:41


 


BP  176/95   08/29/22 08:41


 


Pulse Ox  100   08/29/22 08:42

## 2022-09-01 ENCOUNTER — HOSPITAL ENCOUNTER (EMERGENCY)
Dept: HOSPITAL 5 - ED | Age: 32
Discharge: LEFT BEFORE DISCHARGE | End: 2022-09-01
Payer: SELF-PAY

## 2022-09-01 VITALS — DIASTOLIC BLOOD PRESSURE: 102 MMHG | SYSTOLIC BLOOD PRESSURE: 165 MMHG

## 2022-09-01 DIAGNOSIS — Z53.21: ICD-10-CM

## 2022-09-01 DIAGNOSIS — R53.1: Primary | ICD-10-CM
